# Patient Record
Sex: FEMALE | Race: WHITE | NOT HISPANIC OR LATINO | URBAN - METROPOLITAN AREA
[De-identification: names, ages, dates, MRNs, and addresses within clinical notes are randomized per-mention and may not be internally consistent; named-entity substitution may affect disease eponyms.]

---

## 2017-11-10 ENCOUNTER — OFFICE (OUTPATIENT)
Dept: URBAN - METROPOLITAN AREA CLINIC 11 | Facility: CLINIC | Age: 57
End: 2017-11-10

## 2017-11-10 VITALS
SYSTOLIC BLOOD PRESSURE: 142 MMHG | HEIGHT: 67 IN | DIASTOLIC BLOOD PRESSURE: 71 MMHG | HEART RATE: 69 BPM | WEIGHT: 168 LBS

## 2017-11-10 DIAGNOSIS — R13.19 OTHER DYSPHAGIA: ICD-10-CM

## 2017-11-10 DIAGNOSIS — F17.200 NICOTINE DEPENDENCE, UNSPECIFIED, UNCOMPLICATED: ICD-10-CM

## 2017-11-10 DIAGNOSIS — K21.9 GASTRO-ESOPHAGEAL REFLUX DISEASE WITHOUT ESOPHAGITIS: ICD-10-CM

## 2017-11-10 PROCEDURE — 99203 OFFICE O/P NEW LOW 30 MIN: CPT | Performed by: INTERNAL MEDICINE

## 2017-11-10 RX ORDER — SODIUM PICOSULFATE, MAGNESIUM OXIDE, AND ANHYDROUS CITRIC ACID 10; 3.5; 12 MG/16.1G; G/16.1G; G/16.1G
POWDER, METERED ORAL
Qty: 1 | Refills: 0 | Status: COMPLETED
Start: 2017-11-10 | End: 2017-12-18

## 2017-11-10 NOTE — SERVICENOTES
To improve her reflux symptoms she will need to stop smoking and likely lose some weight.  She also needs to be on medication and is somewhat hesitant to take proton pump inhibitors.  We discussed both procedures in detail including the risk of anesthesia problems, bleeding if something is removed, and or perforation.

## 2017-11-10 NOTE — SERVICEHPINOTES
57-year-old white female referred with a long-term history of reflux problems that have been quite severe.  She has the head of her bed elevated and has taken Prilosec OTC off and on over the years but not on a routine basis.  She continues to have significant pyrosis problems and indigestion with a lot of gas and belching.  This is worse of late since she has started to use CPAP for sleep apnea.  The she is use baking soda which seems to help very temporarily.  The patient also describes dysphagia to solids more so than liquids and does not have food impactions.  She has had no bleeding or melena.  There has been no weight loss in fact weight gain.  She does not have a cardiac history and no chest pain or shortness of breath.  The patient does smoke as a risk factor 1 pack cigarettes per day.  She has not had any form of colon screening in the past.

## 2017-12-18 ENCOUNTER — AMBULATORY SURGICAL CENTER (OUTPATIENT)
Dept: URBAN - METROPOLITAN AREA SURGERY 3 | Facility: SURGERY | Age: 57
End: 2017-12-18
Payer: COMMERCIAL

## 2017-12-18 ENCOUNTER — OFFICE (OUTPATIENT)
Dept: URBAN - METROPOLITAN AREA PATHOLOGY 22 | Facility: PATHOLOGY | Age: 57
End: 2017-12-18
Payer: COMMERCIAL

## 2017-12-18 VITALS
SYSTOLIC BLOOD PRESSURE: 120 MMHG | OXYGEN SATURATION: 97 % | OXYGEN SATURATION: 97 % | SYSTOLIC BLOOD PRESSURE: 120 MMHG | OXYGEN SATURATION: 96 % | HEART RATE: 85 BPM | OXYGEN SATURATION: 19 % | DIASTOLIC BLOOD PRESSURE: 69 MMHG | SYSTOLIC BLOOD PRESSURE: 128 MMHG | TEMPERATURE: 98 F | HEART RATE: 76 BPM | TEMPERATURE: 98 F | DIASTOLIC BLOOD PRESSURE: 72 MMHG | SYSTOLIC BLOOD PRESSURE: 123 MMHG | TEMPERATURE: 98.6 F | SYSTOLIC BLOOD PRESSURE: 121 MMHG | DIASTOLIC BLOOD PRESSURE: 72 MMHG | SYSTOLIC BLOOD PRESSURE: 123 MMHG | TEMPERATURE: 98.6 F | SYSTOLIC BLOOD PRESSURE: 121 MMHG | HEART RATE: 85 BPM | SYSTOLIC BLOOD PRESSURE: 133 MMHG | RESPIRATION RATE: 16 BRPM | DIASTOLIC BLOOD PRESSURE: 57 MMHG | DIASTOLIC BLOOD PRESSURE: 78 MMHG | DIASTOLIC BLOOD PRESSURE: 60 MMHG | SYSTOLIC BLOOD PRESSURE: 128 MMHG | HEART RATE: 73 BPM | SYSTOLIC BLOOD PRESSURE: 133 MMHG | HEART RATE: 77 BPM | RESPIRATION RATE: 19 BRPM | DIASTOLIC BLOOD PRESSURE: 57 MMHG | HEART RATE: 71 BPM | OXYGEN SATURATION: 19 % | HEART RATE: 73 BPM | DIASTOLIC BLOOD PRESSURE: 60 MMHG | HEIGHT: 67 IN | WEIGHT: 160 LBS | HEART RATE: 76 BPM | HEART RATE: 77 BPM | RESPIRATION RATE: 19 BRPM | RESPIRATION RATE: 18 BRPM | HEART RATE: 71 BPM | HEIGHT: 67 IN | RESPIRATION RATE: 16 BRPM | RESPIRATION RATE: 18 BRPM | DIASTOLIC BLOOD PRESSURE: 78 MMHG | DIASTOLIC BLOOD PRESSURE: 69 MMHG | WEIGHT: 160 LBS | OXYGEN SATURATION: 96 %

## 2017-12-18 DIAGNOSIS — D12.3 BENIGN NEOPLASM OF TRANSVERSE COLON: ICD-10-CM

## 2017-12-18 DIAGNOSIS — D12.5 BENIGN NEOPLASM OF SIGMOID COLON: ICD-10-CM

## 2017-12-18 DIAGNOSIS — R13.10 DYSPHAGIA, UNSPECIFIED: ICD-10-CM

## 2017-12-18 DIAGNOSIS — Z12.11 ENCOUNTER FOR SCREENING FOR MALIGNANT NEOPLASM OF COLON: ICD-10-CM

## 2017-12-18 DIAGNOSIS — K21.9 GASTRO-ESOPHAGEAL REFLUX DISEASE WITHOUT ESOPHAGITIS: ICD-10-CM

## 2017-12-18 PROCEDURE — 45385 COLONOSCOPY W/LESION REMOVAL: CPT | Mod: 33 | Performed by: INTERNAL MEDICINE

## 2017-12-18 PROCEDURE — 43248 EGD GUIDE WIRE INSERTION: CPT | Performed by: INTERNAL MEDICINE

## 2017-12-18 PROCEDURE — 45380 COLONOSCOPY AND BIOPSY: CPT | Mod: 59 | Performed by: INTERNAL MEDICINE

## 2017-12-18 PROCEDURE — 88305 TISSUE EXAM BY PATHOLOGIST: CPT | Performed by: INTERNAL MEDICINE

## 2017-12-18 NOTE — SERVICEHPINOTES
57-year-old white female referred with a long-term history of reflux problems that have been quite severe. She has the head of her bed elevated and has taken Prilosec OTC off and on over the years but not on a routine basis. She continues to have significant pyrosis problems and indigestion with a lot of gas and belching. This is worse of late since she has started to use CPAP for sleep apnea. The she is use baking soda which seems to help very temporarily. The patient also describes dysphagia to solids more so than liquids and does not have food impactions. She has had no bleeding or melena. There has been no weight loss in fact weight gain. She does not have a cardiac history and no chest pain or shortness of breath. The patient does smoke as a risk factor 1 pack cigarettes per day. She has not had any form of colon screening in the past.  Pt. has been off of her phentermine for 6 days and I feel is very safe to proceed with studies today under deep sedation.